# Patient Record
Sex: FEMALE
[De-identification: names, ages, dates, MRNs, and addresses within clinical notes are randomized per-mention and may not be internally consistent; named-entity substitution may affect disease eponyms.]

---

## 2017-05-26 ENCOUNTER — HEALTH MAINTENANCE LETTER (OUTPATIENT)
Age: 57
End: 2017-05-26

## 2017-11-17 ENCOUNTER — HOSPITAL ENCOUNTER (INPATIENT)
Facility: CLINIC | Age: 57
Setting detail: SURGERY ADMIT
End: 2017-11-17
Attending: ORTHOPAEDIC SURGERY | Admitting: ORTHOPAEDIC SURGERY
Payer: COMMERCIAL

## 2019-09-28 ENCOUNTER — HEALTH MAINTENANCE LETTER (OUTPATIENT)
Age: 59
End: 2019-09-28

## 2021-01-10 ENCOUNTER — HEALTH MAINTENANCE LETTER (OUTPATIENT)
Age: 61
End: 2021-01-10

## 2021-10-23 ENCOUNTER — HEALTH MAINTENANCE LETTER (OUTPATIENT)
Age: 61
End: 2021-10-23

## 2022-02-12 ENCOUNTER — HEALTH MAINTENANCE LETTER (OUTPATIENT)
Age: 62
End: 2022-02-12

## 2022-10-09 ENCOUNTER — HEALTH MAINTENANCE LETTER (OUTPATIENT)
Age: 62
End: 2022-10-09

## 2023-02-18 ENCOUNTER — HEALTH MAINTENANCE LETTER (OUTPATIENT)
Age: 63
End: 2023-02-18

## 2023-05-27 ENCOUNTER — HEALTH MAINTENANCE LETTER (OUTPATIENT)
Age: 63
End: 2023-05-27

## 2023-10-27 ENCOUNTER — TELEPHONE (OUTPATIENT)
Dept: OPHTHALMOLOGY | Facility: CLINIC | Age: 63
End: 2023-10-27
Payer: COMMERCIAL

## 2023-10-27 NOTE — TELEPHONE ENCOUNTER
[facility] Call Center    Phone Message    May a detailed message be left on voicemail: yes     Reason for Call: Other: Pt states that  reached out to  about coming in on Tuesday for an appt to be seen calling in about moving forward with scheduling. Please review w/  and contact pt to discuss scheduling. Thank you       Action Taken: Message routed to:  [facility] EYE    Travel Screening: Not Applicable

## 2023-10-27 NOTE — TELEPHONE ENCOUNTER
Called and confirmed appointment with Dr. Best for 10/31 @ 720 am     Clinic address given    Alisha Navarrete Communication Facilitator on 10/27/2023 at 11:44 AM

## 2023-10-28 ENCOUNTER — HEALTH MAINTENANCE LETTER (OUTPATIENT)
Age: 63
End: 2023-10-28

## 2023-10-30 PROBLEM — D32.9 MENINGIOMA (H): Status: ACTIVE | Noted: 2017-12-20

## 2023-10-30 RX ORDER — GABAPENTIN 400 MG/1
800 CAPSULE ORAL
COMMUNITY
Start: 2023-05-10

## 2023-10-30 RX ORDER — ATORVASTATIN CALCIUM 40 MG/1
40 TABLET, FILM COATED ORAL DAILY
COMMUNITY
Start: 2023-05-10 | End: 2024-05-09

## 2023-10-30 RX ORDER — OMEGA-3/DHA/EPA/FISH OIL 300-1000MG
1 CAPSULE ORAL
COMMUNITY

## 2023-10-30 RX ORDER — INSULIN GLARGINE 100 [IU]/ML
16 INJECTION, SOLUTION SUBCUTANEOUS
COMMUNITY
Start: 2023-06-06 | End: 2024-06-05

## 2023-10-30 RX ORDER — MAGNESIUM AMINO ACID CHELATE 100 MG
2 TABLET ORAL DAILY
COMMUNITY

## 2023-10-30 NOTE — PROGRESS NOTES
Alba Nunez is a 63 year old female with the following diagnoses:   1. AION (acute ischemic optic neuropathy), unspecified laterality    2. 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 She was diagnosed with sensory exotropia and scheduled for strabismus surgery in 2016 with Dr. Chapman. The procedure was cancelled.    She reports that 10/21 she received covid and flu shot. Over the next few days she noticed a headache and that her vision  started feeling strange. She noted a curtain coming up from inferiorly. She feels that the vision has become worse over the last 10 days. In the beginning she had a headache that was a throbbing pain. The headache resolved over the next few days and currently she only has a sensation of left eye strain. She saw Dr. Garay 10/25 who noted left optic disc edema and sent her to the ED for MRI brain and orbit as well as inflammatory markers to rule out GCA.    Patient denies scalp tenderness, jaw claudication, fever, fatigue, unintended weight loss, double vision, vision loss, muscle pain in shoulders.      Independent historians:  Patient's mom Jeana    Review of outside testing:    10/25/23:  Normal ESR, CRP  Glucose 113, WBC 11.2    10/25/23 MRI brain and orbit wwo  IMPRESSION:    1. No acute intracranial pathology.   2. Long segment atrophic changes of the right optic nerve similar to prior. The left optic nerve is larger than the atrophic right although possibly slightly smaller/atrophic compared to prior exam. No focal signal or enhancement within the optic nerve.   3. No orbital masses.   4. Mild chronic small vessel ischemic disease changes similar to prior. Small chronic lacunar infarct in the left basal ganglia is similar.   5. Unchanged tiny enhancing focus within the right cerebellar pontine angle which may  represent a small meningioma or schwannoma.     My interpretation performed today of outside testing:  I have independently reviewed MRI brain and orbit performed 10/25/23.  No abnormal FLAIR hyperintensity or enhancement in the orbits or elsewhere along the visual pathways.        Review of outside clinical notes:    10/25/23 -- Visit with Dr. 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 Differential diagnosis includes giant cell arteritis, nonarteritic ischemic optic neuropathy, mass lesion, diabetic papillitis  - last A1c >14 on 5/10/23, increased from 7.2 in April 2022; recently started Ozempic  --> to ER for ESR, CRP, CBC, MRI brain and orbits with and without  Discussed if ESR and CRP elevated would need high dose steroids for GCA. Discussed do not want to start steroids without checking these labs, especially in the context of poorly controlled diabetes. Patient and  expressed understanding.  Discussed with Dr Ribera and ER admitting  To ER for labs and MRI  To follow up with Dr Ribera     8/17/16 -- Visit with Dr. Chapman   1. Right sensory exotropia following severe vision loss in the right eye from non-arteritic anterior ischemic optic neuropathy   - patient bothered by increasing exotropia   - angle increases at near  - patient highly motivated for strabismus  surgery  -We discussed in detail the risks, benefits, and alternatives of eye muscle correction surgery including the very rare risk of death or serious morbidity from a general anesthesia complication and the rare risk of severe vision loss in the operative eye(s) secondary to retinal detachment or endophthalmitis.  We discussed more likely sub-optimal outcomes including the unanticipated need for additional strabismus surgery soon after initial surgery.  Also the patient was aware that given her poor vision in the right eye it is not uncommon for the eye to drift out (or in) in the future and may lead to the need for additional strabismus surgery.  After a thorough discussion of these risks, the patient decided to proceed with strabismus surgery.  The surgical plan is as follows:              1. Small right medial rectus resection  Follow-up 1 week after surgery     Past medical history:    Patient Active Problem List   Diagnosis    Type 2 diabetes mellitus, controlled (H)    Obesity    Meningioma (H)    Hyperlipidemia    History of SCC (squamous cell carcinoma) of skin    DJD (degenerative joint disease) of cervical spine    Essential hypertension    Adjustment disorder with anxiety         Medications:     Current Outpatient Medications:     atorvastatin (LIPITOR) 40 MG tablet, Take 40 mg by mouth daily, Disp: , Rfl:     gabapentin (NEURONTIN) 400 MG capsule, Take 800 mg by mouth, Disp: , Rfl:     insulin glargine (LANTUS SOLOSTAR) 100 UNIT/ML pen, Inject 16 Units Subcutaneous, Disp: , Rfl:     OZEMPIC, 0.25 OR 0.5 MG/DOSE, 2 MG/3ML pen, , Disp: , Rfl:     Semaglutide, 2 MG/DOSE, (OZEMPIC) 8 MG/3ML pen, Inject 2 mg Subcutaneous, Disp: , Rfl:     vibegron (GEMTESA) 75 MG TABS tablet, Take 1 tablet by mouth daily, Disp: , Rfl:     aspirin 81 MG tablet, Take 81 mg by mouth daily, Disp: , Rfl:     Chelated Magnesium 100 MG TABS, Take 2 tablets by mouth daily, Disp: , Rfl:     Cholecalciferol (VITAMIN D3 PO), Take  30,000 mg by mouth three times a week, Disp: , Rfl:     Coenzyme Q10 (COQ10) 100 MG CAPS, Take 100 mg by mouth daily, Disp: , Rfl:     fish oil-omega-3 fatty acids 1000 MG capsule, Take 1 capsule by mouth, Disp: , Rfl:     metFORMIN (GLUCOPHAGE) 1000 MG tablet, Take 1,000 mg by mouth 2 times daily (with meals), Disp: , Rfl:     multivitamin, therapeutic with minerals (THERA-VIT-M) TABS, Take 1 tablet by mouth daily, Disp: , Rfl:     pravastatin (PRAVACHOL) 20 MG tablet, Take 20 mg by mouth daily, Disp: , Rfl:         Family history / social history:  Patient's family history includes Diabetes in her maternal grandfather, maternal grandmother, paternal grandfather, and paternal grandmother.     Patient  reports that she has never smoked. She does not have any smokeless tobacco history on file.       Exam:  Visual acuity CF @ 3' right eye 20/25 left eye.  Color vision 0/11 right eye and 10/11 left eye.  Pupils round and reactive without afferent pupillary defect.  Intraocular pressure 16 right eye and 12 left eye.  Anterior segment exam significant for cataracts.  Fundus exam with pallor of right optic nerve and swelling of left optic nerve.  Strabismus exam  with sensory XT    Exam showed no temporal tenderness. Temporal artery is pulsatile without gross palpable thickening.    Tests ordered and interpreted today:    Glaucoma Top OU          Left Eye  Reliability of the test: Poor   . Findings: Altitudinal defect   . Test Findings Free Text: Inferior altitudinal defect with scattered superior nonspecific defect   . Interpretation: Abnormal   . Plan: Monitor   . Interval: Initial   .          Low Vision Central OD          Reliability of the test: Good   . Findings: Depressed mean deviation   . Interpretation: Abnormal   . Plan: Monitor   . Interval: Better   .          OCT Optic Nerve RNFL Spectralis OU (both eyes)          Performed by: wt   .     Right Eye  Interpretation: Inferior loss, Nasal loss, Superior loss    . Plan: Monitor   . Interval: Worse   .     Left Eye  Average Thickness Value: 360   . Interpretation Free Text: Diffuse swelling   . Plan: Monitor   . Interval: Worse   .              Discussion of management / interpretation with another provider:   None    Assessment/Plan:   It is my impression that patient has NAION of her left eye. She has a history of NAION right eye and per chart records had a small cup to disc ratio of her left eye. Today she presents with optic disc edema and altitudinal defect.    She does not smoke. Although her most recent A1c was 7, she is working to get her DM under control. She follows closely with her diabetes specialist and has an appointment forthcoming. She has JAELYN and she wears her CPAP nightly.     We discussed the use of prednisone in ischemic optic neuropathy we discussed that there is notreatment for ischemic optic neuropathy.  We also discussed the study that came out of [place] looking at high-dose oral prednisone and the lack of benefit for those with better than 20/70 vision.  Her visual acuity is 20/25 in the left eye today.  She has not noticed worsening of her vision over the last day or so.  We discussed that it is common for people to lose vision for about 3 to 7 days and then to stabilize.  She is interested in the prednisone but wants to think about it.  I think it is reasonable to hold off on it versus giving her a trial for 2 weeks given that she is a monocular patient.  Follow-up 2 weeks sooner as needed for worsening symptoms.    30 minutes spent on the date of encounter doing chart review, history and exam, documentation, and further activities as noted above.             Attending Physician Attestation:  Complete documentation of historical and exam elements from today's encounter can be found in the full encounter summary report (not reduplicated in this progress note).  I personally obtained the chief complaint(s) and history of present illness.  I confirmed  and edited as necessary the review of systems, past medical/surgical history, family history, social history, and examination findings as documented by others; and I examined the patient myself.  I personally reviewed the relevant tests, images, and reports as documented above.  I formulated and edited as necessary the assessment and plan and discussed the findings and management plan with the patient and family. I personally reviewed the ophthalmic test(s) associated with this encounter, agree with the interpretation(s) as documented by the resident/fellow, and have edited the corresponding report(s) as necessary.  - Juvencio Mora MD, PGY3  Ophthalmology Resident  [facility]

## 2023-10-31 ENCOUNTER — TELEPHONE (OUTPATIENT)
Dept: OPHTHALMOLOGY | Facility: CLINIC | Age: 63
End: 2023-10-31

## 2023-10-31 ENCOUNTER — OFFICE VISIT (OUTPATIENT)
Dept: OPHTHALMOLOGY | Facility: CLINIC | Age: 63
End: 2023-10-31
Attending: OPHTHALMOLOGY
Payer: COMMERCIAL

## 2023-10-31 DIAGNOSIS — H47.10 EDEMA OF OPTIC DISC OF LEFT EYE: Primary | ICD-10-CM

## 2023-10-31 DIAGNOSIS — H47.10 EDEMA OF OPTIC DISC OF LEFT EYE: ICD-10-CM

## 2023-10-31 DIAGNOSIS — H47.019 AION (ACUTE ISCHEMIC OPTIC NEUROPATHY), UNSPECIFIED LATERALITY: Primary | ICD-10-CM

## 2023-10-31 DIAGNOSIS — H53.40 VISUAL FIELD DEFECT: Primary | ICD-10-CM

## 2023-10-31 PROCEDURE — 99203 OFFICE O/P NEW LOW 30 MIN: CPT | Mod: GC | Performed by: OPHTHALMOLOGY

## 2023-10-31 PROCEDURE — 92083 EXTENDED VISUAL FIELD XM: CPT | Performed by: OPHTHALMOLOGY

## 2023-10-31 PROCEDURE — 92133 CPTRZD OPH DX IMG PST SGM ON: CPT | Performed by: OPHTHALMOLOGY

## 2023-10-31 PROCEDURE — G0463 HOSPITAL OUTPT CLINIC VISIT: HCPCS | Performed by: OPHTHALMOLOGY

## 2023-10-31 RX ORDER — SEMAGLUTIDE 0.68 MG/ML
INJECTION, SOLUTION SUBCUTANEOUS
COMMUNITY
Start: 2023-06-16

## 2023-10-31 RX ORDER — PREDNISONE 20 MG/1
80 TABLET ORAL DAILY
Qty: 43 TABLET | Refills: 0 | Status: CANCELLED | OUTPATIENT
Start: 2023-10-31

## 2023-10-31 ASSESSMENT — VISUAL ACUITY
OD_SC: CF @ 3'
METHOD: SNELLEN - LINEAR
OS_SC: 20/25

## 2023-10-31 ASSESSMENT — TONOMETRY
IOP_METHOD: ICARE
OD_IOP_MMHG: 16
OS_IOP_MMHG: 12

## 2023-10-31 ASSESSMENT — CONF VISUAL FIELD
METHOD: COUNTING FINGERS
OD_INFERIOR_NASAL_RESTRICTION: 1
OD_INFERIOR_TEMPORAL_RESTRICTION: 1
OS_INFERIOR_TEMPORAL_RESTRICTION: 3
OD_SUPERIOR_NASAL_RESTRICTION: 1
OD_SUPERIOR_TEMPORAL_RESTRICTION: 3
OS_INFERIOR_NASAL_RESTRICTION: 3

## 2023-10-31 ASSESSMENT — SLIT LAMP EXAM - LIDS
COMMENTS: MGD, BLEPHARITIS
COMMENTS: MGD, BLEPHARITIS

## 2023-10-31 ASSESSMENT — CUP TO DISC RATIO: OD_RATIO: 0

## 2023-10-31 ASSESSMENT — EXTERNAL EXAM - LEFT EYE: OS_EXAM: NORMAL

## 2023-10-31 ASSESSMENT — EXTERNAL EXAM - RIGHT EYE: OD_EXAM: NORMAL

## 2023-10-31 NOTE — PATIENT INSTRUCTIONS
You can also watch a video on this topic here:     [email or web address]

## 2023-10-31 NOTE — TELEPHONE ENCOUNTER
[facility] Call Center    Phone Message    May a detailed message be left on voicemail: yes     Reason for Call: Other: Patient would like to know what the dose of the prednisone would be, and if there would be any physical limitations.  She states she spoke with the diabetes dept at [facility] and they have a special form of insulin to combat the sugar spikes,  She would also like to know if she could follow up with [facility] in 2 weeks or should she return to [facility].       Action Taken: Message routed to:  [facility] Ophthalmology    Travel Screening: Not Applicable

## 2023-11-01 RX ORDER — PREDNISONE 20 MG/1
80 TABLET ORAL DAILY
Qty: 56 TABLET | Refills: 0 | Status: SHIPPED | OUTPATIENT
Start: 2023-11-01

## 2024-01-06 ENCOUNTER — HEALTH MAINTENANCE LETTER (OUTPATIENT)
Age: 64
End: 2024-01-06

## 2024-05-25 ENCOUNTER — HEALTH MAINTENANCE LETTER (OUTPATIENT)
Age: 64
End: 2024-05-25

## 2024-05-30 NOTE — TELEPHONE ENCOUNTER
[facility] Call Center    Phone Message    May a detailed message be left on voicemail: yes     Reason for Call: Other: Patient called back. She was given Dr. Best's message. She is asking if Dr. Best can prescribe the medication and send it to [facility]. Please advise and follow up with patient.      Action Taken: Other: eye    Travel Screening: Not Applicable                                                                    Refused to have colonoscopy prefer to have cologuard can the office put the order in.

## 2024-08-03 ENCOUNTER — HEALTH MAINTENANCE LETTER (OUTPATIENT)
Age: 64
End: 2024-08-03

## 2024-10-07 NOTE — TELEPHONE ENCOUNTER
Nurse to nurse report called to [facility].     Transport requested.    Orin Santana RN, BSN      Spoke to patent.  She would like the prescription sent to her pharmacy. Prescription sent.  She thinks her vision worsened again today.  She will go in to either [facility] or call us to be seen if worsens.  Otherwise follow up here or there in 2 weeks.     Katina Marte on 11/1/2023 at 10:44 AM

## 2024-10-12 ENCOUNTER — HEALTH MAINTENANCE LETTER (OUTPATIENT)
Age: 64
End: 2024-10-12

## 2025-01-25 ENCOUNTER — HEALTH MAINTENANCE LETTER (OUTPATIENT)
Age: 65
End: 2025-01-25

## 2025-05-03 ENCOUNTER — HEALTH MAINTENANCE LETTER (OUTPATIENT)
Age: 65
End: 2025-05-03

## 2025-08-16 ENCOUNTER — HEALTH MAINTENANCE LETTER (OUTPATIENT)
Age: 65
End: 2025-08-16